# Patient Record
Sex: MALE | Race: OTHER
[De-identification: names, ages, dates, MRNs, and addresses within clinical notes are randomized per-mention and may not be internally consistent; named-entity substitution may affect disease eponyms.]

---

## 2018-08-19 ENCOUNTER — HOSPITAL ENCOUNTER (EMERGENCY)
Dept: HOSPITAL 41 - JD.ED | Age: 53
Discharge: HOME | End: 2018-08-19
Payer: COMMERCIAL

## 2018-08-19 DIAGNOSIS — K04.7: Primary | ICD-10-CM

## 2018-08-19 DIAGNOSIS — Z79.82: ICD-10-CM

## 2018-08-19 PROCEDURE — 99283 EMERGENCY DEPT VISIT LOW MDM: CPT

## 2018-08-19 NOTE — EDM.PDOC
ED HPI GENERAL MEDICAL PROBLEM





- General


Chief Complaint: ENT Problem


Stated Complaint: SEVERE TOOTH ACHE


Time Seen by Provider: 08/19/18 14:19


Source of Information: Reports: Patient


History Limitations: Reports: No Limitations





- History of Present Illness


INITIAL COMMENTS - FREE TEXT/NARRATIVE: 





52-year-old male presents for evaluation and treatment of pain to the right 

lower tooth. Started last night. He has tried oragel, Tylenol, Motrin, rinses 

and other at home treatments without any relief. He reports pain to the right 

lower tooth and a bad taste in his mouth. States this happened about a year ago 

to the tooth next to this one. He saw dentist and had it taken out. He denies 

any fevers, chills, nausea, vomiting or any facial swelling. Reports pain is in 

the right lower jaw and radiates up towards his right ear.





Patient is from [place]. 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


  ** Right Oral/Mouth


Pain Score (Numeric/FACES): 10





- Related Data


 Allergies











Allergy/AdvReac Type Severity Reaction Status Date / Time


 


No Known Allergies Allergy   Verified 08/19/18 13:46











Home Meds: 


 Home Meds





Amoxicillin/Clavulanate K [Augmentin 875-125 MG] 1 tab PO BID #20 tab 08/19/18 [

Rx]


Aspirin 1 tab PO DAILY 08/19/18 [History]


Hydrocodone/Acetaminophen [Hydrocodon-Acetaminophen 5-325] 1 each PO Q4HR PRN #

15 tablet 08/19/18 [Rx]


Metoprolol Tartrate 1 tab PO BID 08/19/18 [History]


Tricagrelor 1 tab PO BID 08/19/18 [History]


atorvaSTATin [Lipitor] 1 tab PO DAILY 08/19/18 [History]











Past Medical History


Cardiovascular History: Reports: MI, Stents





Social & Family History





- Tobacco Use


Smoking Status *Q: Never Smoker


Second Hand Smoke Exposure: No





- Caffeine Use


Caffeine Use: Reports: None





- Recreational Drug Use


Recreational Drug Use: No





ED ROS ENT





- Review of Systems


Review Of Systems: See Below


Constitutional: Denies: Fever, Chills


HEENT: Reports: Dental Pain (right lower jaw), Ear Pain (right)


GI/Abdominal: Denies: Nausea, Vomiting





ED EXAM, ENT





- Physical Exam


Exam: See Below


Exam Limited By: No Limitations


General Appearance: Alert, WD/WN, No Apparent Distress


Eye Exam: Bilateral Eye: Normal Inspection


Ears: Normal External Exam, Normal Canal, Hearing Grossly Normal, Normal TMs


Nose: Normal Inspection


Mouth/Throat: Normal Inspection, Normal Lips, Normal Oropharynx, Dental Abcess (

#31), Dental Pain (#31)


Respiratory/Chest: No Respiratory Distress, Lungs Clear, Normal Breath Sounds


Cardiovascular: Normal Peripheral Pulses, Regular Rate, Rhythm, No Murmur


Neurological: Alert, Oriented, Normal Cognition


Psychiatric: Normal Affect, Normal Mood


Skin: Warm, Dry, Normal Color





Course





- Vital Signs


Last Recorded V/S: 


 Last Vital Signs











Temp  97.8 F   08/19/18 13:46


 


Pulse  88   08/19/18 13:46


 


Resp  18   08/19/18 13:46


 


BP  146/107 H  08/19/18 13:46


 


Pulse Ox  95   08/19/18 13:46














- Orders/Labs/Meds


Meds: 


Medications














Discontinued Medications














Generic Name Dose Route Start Last Admin





  Trade Name Freq  PRN Reason Stop Dose Admin


 


Hydrocodone Bitart/Acetaminophen  1 tab  08/19/18 14:28  08/19/18 14:38





  Norco 325-10 Mg  PO  08/19/18 14:29  2 tab





  ONETIME ONE   Administration





     





     





     





     














Departure





- Departure


Time of Disposition: 14:30


Disposition: Home, Self-Care 01


Condition: Fair


Clinical Impression: 


 Dental abscess, Pain, dental








- Discharge Information


*PRESCRIPTION DRUG MONITORING PROGRAM REVIEWED*: Yes


*COPY OF PRESCRIPTION DRUG MONITORING REPORT IN PATIENT KALIA: No


Prescriptions: 


Hydrocodone/Acetaminophen [Hydrocodon-Acetaminophen 5-325] 1 each PO Q4HR PRN #

15 tablet


 PRN Reason: Pain


Amoxicillin/Clavulanate K [Augmentin 875-125 MG] 1 tab PO BID #20 tab


Instructions:  Dental Abscess, Easy-to-Read


Referrals: 


PCP,Not In Area [Primary Care Provider] - 


Forms:  ED Department Discharge


Additional Instructions: 


you were given medication in the ER that can affect your ability to drive and 

operate machinery. Do not drive or operate machinery within 10 hours of taking 

prescription narcotic pain medication.





Follow-up with the dentist as soon as possible you are able to. a list of local 

dentist in [place] has been prescribed provided for you should you need a 

local recommendation.





Augmentin 1 tablet twice a day for 10 days. Take this with food. Recommend 

yogurt or probiotic to help reduce side effects of upset stomach, nausea, 

diarrhea.





Over-the-counter ibuprofen as needed for pain. May take up to 3200 mg of 

ibuprofen 1 day. For pain not relieved by ibuprofen may take Norco. Take 1 or 2 

tabs every 6 hours. Norco is habit-forming, take as little as needed to control 

your pain. Do not drive or operate machinery within 10 hours of taking Norco.





 please return to ER for symptoms change or worsen.